# Patient Record
Sex: MALE | Race: WHITE | HISPANIC OR LATINO | Employment: FULL TIME | URBAN - METROPOLITAN AREA
[De-identification: names, ages, dates, MRNs, and addresses within clinical notes are randomized per-mention and may not be internally consistent; named-entity substitution may affect disease eponyms.]

---

## 2020-07-06 ENCOUNTER — HOSPITAL ENCOUNTER (EMERGENCY)
Facility: HOSPITAL | Age: 25
Discharge: HOME | End: 2020-07-06
Attending: EMERGENCY MEDICINE
Payer: OTHER GOVERNMENT

## 2020-07-06 VITALS
SYSTOLIC BLOOD PRESSURE: 155 MMHG | HEART RATE: 62 BPM | OXYGEN SATURATION: 98 % | DIASTOLIC BLOOD PRESSURE: 101 MMHG | TEMPERATURE: 99 F | HEIGHT: 66 IN | BODY MASS INDEX: 36.16 KG/M2 | RESPIRATION RATE: 18 BRPM | WEIGHT: 225 LBS

## 2020-07-06 DIAGNOSIS — R03.0 ELEVATED BLOOD PRESSURE READING: ICD-10-CM

## 2020-07-06 DIAGNOSIS — J02.9 SORE THROAT: ICD-10-CM

## 2020-07-06 DIAGNOSIS — R51.9 ACUTE NONINTRACTABLE HEADACHE, UNSPECIFIED HEADACHE TYPE: Primary | ICD-10-CM

## 2020-07-06 LAB — SARS-COV-2 RDRP RESP QL NAA+PROBE: NEGATIVE

## 2020-07-06 PROCEDURE — 99283 EMERGENCY DEPT VISIT LOW MDM: CPT

## 2020-07-06 PROCEDURE — U0002 COVID-19 LAB TEST NON-CDC: HCPCS

## 2020-07-06 PROCEDURE — 25000003 PHARM REV CODE 250: Performed by: NURSE PRACTITIONER

## 2020-07-06 RX ORDER — IBUPROFEN 600 MG/1
600 TABLET ORAL
Status: COMPLETED | OUTPATIENT
Start: 2020-07-06 | End: 2020-07-06

## 2020-07-06 RX ORDER — IBUPROFEN 600 MG/1
600 TABLET ORAL EVERY 6 HOURS PRN
Qty: 20 TABLET | Refills: 0 | Status: SHIPPED | OUTPATIENT
Start: 2020-07-06

## 2020-07-06 RX ADMIN — IBUPROFEN 600 MG: 600 TABLET, FILM COATED ORAL at 07:07

## 2020-07-06 NOTE — ED TRIAGE NOTES
Pt presents to the ED reporting headaches, sore throat, and burning in the eyes x 2 days. Pt denies SOB.

## 2020-07-07 NOTE — DISCHARGE INSTRUCTIONS
Barnett prueba COVID 19 fue negativa. Sin embargo, debe quedarse en casa si se siente enfermo. Seguimiento con barnett PCP.    Regrese al Departamento de emergencias por cualquier síntoma nuevo o que empeore, incluyendo: fiebre, dolor en el pecho, dificultad para respirar, pérdida del conocimiento, mareos, debilidad o cualquier otra inquietud.    Familia un seguimiento con barnett proveedor de atención primaria dentro de la semana. Si no tiene jaylene, puede comunicarse con el que figura en barnett documentación de ramin o también puede llamar al mostrador de citas de la [facility] al [phone] para programar boubacar selena con jaylene.    [place] todos los medicamentos según lo recetado.    Your COVID 19 test was negative.  However,  you should stay home if you are feeling ill.  Follow-up with your PCP.    Please return to the Emergency Department for any new or worsening symptoms including: fever, chest pain, shortness of breath, loss of consciousness, dizziness, weakness, or any other concerns.     Please follow up with your Primary Care Provider within in the week. If you do not have one, you may contact the one listed on your discharge paperwork or you may also call the [facility] Appointment Desk at [phone] to schedule an appointment with one.     Please take all medication as prescribed.

## 2020-07-07 NOTE — ED PROVIDER NOTES
Encounter Date: 7/6/2020    SCRIBE #1 NOTE: ILakhwinder am scribing for, and in the presence of, Makayla Gan NP.       History     Chief Complaint   Patient presents with    Headache     statrd x2 days    Sore Throat     Floyd Zavala is a 25 y.o. male who presents to the ED with an onset of generalized headaches, sore throat, and bilateral burning of the eyes lasting two days. Patient denies any associated symptoms. No cardiopulmonary medical or surgical history. Denies any recent sick contact but wishes to be tested for COVID-19 regardless. He endorses occasional social alcohol use, primarily on weekends. No known drug allergies.  utilized via video call on a [employer] device.    The history is provided by the patient. The history is limited by a language barrier. A  was used ([place]).     Review of patient's allergies indicates:  No Known Allergies  History reviewed. No pertinent past medical history.  History reviewed. No pertinent surgical history.  History reviewed. No pertinent family history.  Social History     Tobacco Use    Smoking status: Never Smoker   Substance Use Topics    Alcohol use: Yes     Comment: weekends    Drug use: Never     Review of Systems   Constitutional: Negative for fever.   HENT: Positive for sore throat. Negative for rhinorrhea and trouble swallowing.    Eyes: Positive for pain. Negative for photophobia, discharge, itching and visual disturbance.   Respiratory: Negative for cough and shortness of breath.    Cardiovascular: Negative for chest pain.   Gastrointestinal: Negative for nausea.   Genitourinary: Negative for dysuria.   Musculoskeletal: Negative for back pain.   Skin: Negative for rash.   Neurological: Positive for headaches. Negative for syncope and weakness.   Hematological: Does not bruise/bleed easily.       Physical Exam     Initial Vitals [07/06/20 1739]   BP Pulse Resp Temp SpO2   (!) 175/101 72 18  98.8 °F (37.1 °C) 98 %      MAP       --         Physical Exam    Nursing note and vitals reviewed.  Constitutional: He appears well-developed and well-nourished. No distress.   HENT:   Head: Normocephalic and atraumatic.   Mouth/Throat: Oropharynx is clear and moist.   Eyes: Conjunctivae and EOM are normal. Pupils are equal, round, and reactive to light. No scleral icterus.   Neck: Normal range of motion. Neck supple.   Cardiovascular: Normal rate, regular rhythm, normal heart sounds and intact distal pulses. Exam reveals no gallop and no friction rub.    No murmur heard.  Pulmonary/Chest: Breath sounds normal. No respiratory distress. He has no wheezes. He has no rhonchi. He has no rales.   Abdominal: He exhibits no distension.   Musculoskeletal: Normal range of motion. No tenderness or edema.   Neurological: He is alert and oriented to person, place, and time.   Skin: Skin is warm and dry.         ED Course   Procedures  Labs Reviewed   SARS-COV-2 RNA AMPLIFICATION, QUAL          Imaging Results    None          Medical Decision Making:   ED Management:  This is an evaluation of a 25 y.o. male that presents to the Emergency Department for sore throat and headache for 2 days. The patient is a non-toxic, afebrile, and well appearing male. On physical exam ears and pharynx are without evidence of infection. Appears well hydrated with moist mucus membranes. Neck soft and supple with no meningeal signs or cervical lymphadenopathy. Breath sounds are clear and equal bilaterally with no adventitious breath sounds, tachypnea or respiratory distress with room air pulse ox of 98% and no evidence of hypoxia.     Vital Signs Are Reassuring.  RESULTS:   COVID-19 is negative.  However, patient encouraged home quarantine while symptomatic.  Will refer to PCP for blood pressure recheck and further management.    Iconsidered, but at this time, do not suspect OM, OE, strep pharyngitis, meningitis, pneumonia, or acute bacterial  sinusitis.    The diagnosis, treatment plan, instructions for follow-up and reevaluation with PCP as well as ED return precautions were discussed and understanding was verbalized. All questions or concerns have been addressed.               Scribe Attestation:   Scribe #1: I performed the above scribed service and the documentation accurately describes the services I performed. I attest to the accuracy of the note.                          Clinical Impression:       ICD-10-CM ICD-9-CM   1. Acute nonintractable headache, unspecified headache type  R51 784.0   2. Sore throat  J02.9 462   3. Elevated blood pressure reading  R03.0 796.2         Disposition:   Disposition: Discharged  Condition: Stable     ED Disposition Condition    Discharge Stable        ED Prescriptions     Medication Sig Dispense Start Date End Date Auth. Provider    ibuprofen (ADVIL,MOTRIN) 600 MG tablet Take 1 tablet (600 mg total) by mouth every 6 (six) hours as needed for Pain. 20 tablet 7/6/2020  Makayla Gan NP        Follow-up Information     Follow up With Specialties Details Why Contact Info    [facility]  Schedule an appointment as soon as possible for a visit in 1 day For follow-up if you do not have a primary care doctor----elevated blood pressure reading [address] [place] [address]  [phone]      [facility] Emergency [facility] Go to  If symptoms worsen [address]  [facility] [id number] [phone]                      ILOAN, personally performed the services described in this documentation. All medical record entries made by the scribe were at my direction and in my presence. I have reviewed the chart and agree that the record reflects my personal performance and is accurate and complete.             Makayla Gan NP  07/06/20 2002